# Patient Record
Sex: FEMALE | Race: BLACK OR AFRICAN AMERICAN | NOT HISPANIC OR LATINO | Employment: UNEMPLOYED | URBAN - METROPOLITAN AREA
[De-identification: names, ages, dates, MRNs, and addresses within clinical notes are randomized per-mention and may not be internally consistent; named-entity substitution may affect disease eponyms.]

---

## 2017-05-26 ENCOUNTER — HOSPITAL ENCOUNTER (EMERGENCY)
Facility: HOSPITAL | Age: 32
Discharge: HOME | End: 2017-05-26
Attending: EMERGENCY MEDICINE
Payer: MEDICAID

## 2017-05-26 VITALS
RESPIRATION RATE: 16 BRPM | SYSTOLIC BLOOD PRESSURE: 147 MMHG | OXYGEN SATURATION: 100 % | DIASTOLIC BLOOD PRESSURE: 81 MMHG | WEIGHT: 200 LBS | HEIGHT: 69 IN | HEART RATE: 53 BPM | BODY MASS INDEX: 29.62 KG/M2 | TEMPERATURE: 98 F

## 2017-05-26 DIAGNOSIS — R20.2 PARESTHESIA: Primary | ICD-10-CM

## 2017-05-26 DIAGNOSIS — R00.2 PALPITATIONS: ICD-10-CM

## 2017-05-26 LAB
ALBUMIN SERPL BCP-MCNC: 4.1 G/DL
ALP SERPL-CCNC: 86 U/L
ALT SERPL W/O P-5'-P-CCNC: 14 U/L
ANION GAP SERPL CALC-SCNC: 6 MMOL/L
AST SERPL-CCNC: 17 U/L
B-HCG UR QL: NEGATIVE
BACTERIA #/AREA URNS HPF: ABNORMAL /HPF
BASOPHILS # BLD AUTO: 0.02 K/UL
BASOPHILS NFR BLD: 0.5 %
BILIRUB SERPL-MCNC: 0.3 MG/DL
BILIRUB UR QL STRIP: NEGATIVE
BUN SERPL-MCNC: 11 MG/DL
CALCIUM SERPL-MCNC: 9.2 MG/DL
CHLORIDE SERPL-SCNC: 110 MMOL/L
CLARITY UR: CLEAR
CO2 SERPL-SCNC: 24 MMOL/L
COLOR UR: ABNORMAL
CREAT SERPL-MCNC: 0.7 MG/DL
CTP QC/QA: YES
DIFFERENTIAL METHOD: NORMAL
EOSINOPHIL # BLD AUTO: 0 K/UL
EOSINOPHIL NFR BLD: 0.5 %
ERYTHROCYTE [DISTWIDTH] IN BLOOD BY AUTOMATED COUNT: 12.1 %
EST. GFR  (AFRICAN AMERICAN): >60 ML/MIN/1.73 M^2
EST. GFR  (NON AFRICAN AMERICAN): >60 ML/MIN/1.73 M^2
GLUCOSE SERPL-MCNC: 101 MG/DL
GLUCOSE UR QL STRIP: NEGATIVE
HCT VFR BLD AUTO: 37 %
HGB BLD-MCNC: 12.6 G/DL
HGB UR QL STRIP: ABNORMAL
KETONES UR QL STRIP: NEGATIVE
LEUKOCYTE ESTERASE UR QL STRIP: NEGATIVE
LYMPHOCYTES # BLD AUTO: 1.2 K/UL
LYMPHOCYTES NFR BLD: 27.7 %
MAGNESIUM SERPL-MCNC: 2.4 MG/DL
MCH RBC QN AUTO: 30.4 PG
MCHC RBC AUTO-ENTMCNC: 34.1 %
MCV RBC AUTO: 89 FL
MICROSCOPIC COMMENT: ABNORMAL
MONOCYTES # BLD AUTO: 0.3 K/UL
MONOCYTES NFR BLD: 6.3 %
NEUTROPHILS # BLD AUTO: 2.8 K/UL
NEUTROPHILS NFR BLD: 64.8 %
NITRITE UR QL STRIP: NEGATIVE
PH UR STRIP: 8 [PH] (ref 5–8)
PLATELET # BLD AUTO: 266 K/UL
PMV BLD AUTO: 10.3 FL
POTASSIUM SERPL-SCNC: 3.9 MMOL/L
PROT SERPL-MCNC: 7.4 G/DL
PROT UR QL STRIP: NEGATIVE
RBC # BLD AUTO: 4.15 M/UL
RBC #/AREA URNS HPF: 5 /HPF (ref 0–4)
SODIUM SERPL-SCNC: 140 MMOL/L
SP GR UR STRIP: 1.01 (ref 1–1.03)
SQUAMOUS #/AREA URNS HPF: 10 /HPF
URN SPEC COLLECT METH UR: ABNORMAL
UROBILINOGEN UR STRIP-ACNC: NEGATIVE EU/DL
WBC # BLD AUTO: 4.3 K/UL

## 2017-05-26 PROCEDURE — 25000003 PHARM REV CODE 250: Performed by: EMERGENCY MEDICINE

## 2017-05-26 PROCEDURE — 81000 URINALYSIS NONAUTO W/SCOPE: CPT

## 2017-05-26 PROCEDURE — 80053 COMPREHEN METABOLIC PANEL: CPT

## 2017-05-26 PROCEDURE — 83735 ASSAY OF MAGNESIUM: CPT

## 2017-05-26 PROCEDURE — 85025 COMPLETE CBC W/AUTO DIFF WBC: CPT

## 2017-05-26 PROCEDURE — 96361 HYDRATE IV INFUSION ADD-ON: CPT

## 2017-05-26 PROCEDURE — 99283 EMERGENCY DEPT VISIT LOW MDM: CPT | Mod: 25

## 2017-05-26 PROCEDURE — 81025 URINE PREGNANCY TEST: CPT | Performed by: EMERGENCY MEDICINE

## 2017-05-26 PROCEDURE — 96374 THER/PROPH/DIAG INJ IV PUSH: CPT

## 2017-05-26 PROCEDURE — 93005 ELECTROCARDIOGRAM TRACING: CPT

## 2017-05-26 PROCEDURE — 63600175 PHARM REV CODE 636 W HCPCS: Performed by: EMERGENCY MEDICINE

## 2017-05-26 RX ORDER — ONDANSETRON 2 MG/ML
8 INJECTION INTRAMUSCULAR; INTRAVENOUS
Status: COMPLETED | OUTPATIENT
Start: 2017-05-26 | End: 2017-05-26

## 2017-05-26 RX ADMIN — SODIUM CHLORIDE 1000 ML: 0.9 INJECTION, SOLUTION INTRAVENOUS at 11:05

## 2017-05-26 RX ADMIN — ONDANSETRON 8 MG: 2 INJECTION INTRAMUSCULAR; INTRAVENOUS at 11:05

## 2017-05-26 NOTE — ED PROVIDER NOTES
Encounter Date: 5/26/2017    SCRIBE #1 NOTE: I, Arpita Farias, am scribing for, and in the presence of,  Erik Childs MD. I have scribed the following portions of the note - Other sections scribed: ROS and HPI.       History     Chief Complaint   Patient presents with    Numbness     states she feels a numbness in left leg and arm      Review of patient's allergies indicates:   Allergen Reactions    Pcn [penicillins]      rash     CC: Numbness  HPI: This 31 y.o. female with no past medical history on file, presents to the ED complaining of a bilateral generalized numbness and tingling with associated dizziness, palpitations, and x2 episodes of emesis since this morning. No exacerbating or alleviating factors are reported. She states her SOB is resolved. No prior similar episodes. No reported medical intervention. No other  reported complaints.          No past medical history on file.  Past Surgical History:   Procedure Laterality Date    cyst removal from abdomen       Family History   Problem Relation Age of Onset    Diabetes Maternal Grandmother     Cancer Maternal Grandmother     Diabetes Mother      Social History   Substance Use Topics    Smoking status: Never Smoker    Smokeless tobacco: Not on file    Alcohol use No      Comment: occasional     Review of Systems   Constitutional: Negative for fever.   HENT: Negative for congestion.    Eyes: Negative for visual disturbance.   Respiratory: Negative for shortness of breath.    Cardiovascular: Positive for palpitations. Negative for chest pain.   Gastrointestinal: Positive for vomiting. Negative for abdominal pain, diarrhea and nausea.   Genitourinary: Negative for dysuria.   Musculoskeletal: Negative for back pain.   Skin: Negative for rash.   Neurological: Positive for dizziness and numbness (generalized, bilateral). Negative for weakness.   All other systems reviewed and are negative.      Physical Exam     Initial Vitals [05/26/17 1039]   BP Pulse  Resp Temp SpO2   (!) 140/74 (!) 54 18 98 °F (36.7 °C) 100 %     Physical Exam  The patient was examined specifically for the following:   General:No significant distress, Good color, Warm and dry. Head and neck:Scalp atraumatic, Neck supple. Neurological:Appropriate conversation, Gross motor deficits. Eyes:Conjugate gaze, Clear corneas. ENT: No epistaxis. Cardiac: Regular rate and rhythm, Grossly normal heart tones. Pulmonary: Wheezing, Rales. Gastrointestinal: Abdominal tenderness, Abdominal distention. Musculoskeletal: Extremity deformity, Apparent pain with range of motion of the joints. Skin: Rash.   The findings on examination were normal .  Mental status examination, cranial nerves, motor and sensory examination are normal.  The lungs are clear and free wheezing rales of the rhonchi.  Heart tones are normal.  The patient is regular rate and rhythm.  The abdomen is soft.  Extremities are nontender.  There is no pale range of motion of any joints.  Patient has no rash.  ED Course   Procedures  Labs Reviewed   COMPREHENSIVE METABOLIC PANEL - Abnormal; Notable for the following:        Result Value    Anion Gap 6 (*)     All other components within normal limits   URINALYSIS - Abnormal; Notable for the following:     Occult Blood UA 2+ (*)     All other components within normal limits   URINALYSIS MICROSCOPIC - Abnormal; Notable for the following:     RBC, UA 5 (*)     All other components within normal limits   MAGNESIUM   CBC W/ AUTO DIFFERENTIAL   POCT URINE PREGNANCY     EKG Readings: (Independently Interpreted)   This patient is in a sinus bradycardia with a heart rate of 49.  The AK QRS and QT intervals are normal.  There is no evidence of acute myocardial infarction or malignant arrhythmia.       Medical decision making: Given the above, this patient presents with generalized paresthesia.  I could find no significant electrolyte abnormalities to identify cause of this problem.  This may be anxiety.  The  symptoms are so generalized I think it is quite unlikely that this is an ischemic stroke.  The patient has palpitations but I can find no significant arrhythmia.  The patient was monitored and had no arrhythmia.  The patient is in a sinus rhythm right now.  She is in a sinus bradycardia with a heart rate of 52.  I think it is unlikely that this patient is suffering from a morbid disease.  I will discharge her to follow-up with internal medicine.                Scribe Attestation:   Scribe #1: I performed the above scribed service and the documentation accurately describes the services I performed. I attest to the accuracy of the note.    Attending Attestation:           Physician Attestation for Scribe:  Physician Attestation Statement for Scribe #1: I, Erik Childs MD, reviewed documentation, as scribed by Arpita Farias in my presence, and it is both accurate and complete.                 ED Course     Clinical Impression:   The primary encounter diagnosis was Paresthesia. A diagnosis of Palpitations was also pertinent to this visit.          Erik Childs MD  05/26/17 3048